# Patient Record
(demographics unavailable — no encounter records)

---

## 2024-11-01 NOTE — DISCUSSION/SUMMARY
[de-identified] : I examined, evaluated, and discussed with the patient. No DVT at ER at Yue visit. He was doing well after the injection by Dr. Lindsey in June or July. Then, he started to have left low back and posterior thigh pain 2 weeks ago. No clear weakness. MRI on Sep 2024 does not show clear pinched nerve.   Based on physical exam and image findings, the patient diagnosis is muscle spasm related low back pain s/p L4-S1 fusion and possible left sacroiliac joint dysfunction.  Treatment plan is medications PRN and PT specific to left sacroiliac joint.   The patient understands everything and all questions were answered. The patient will return 6-8 weeks.

## 2024-11-01 NOTE — PHYSICAL EXAM
[de-identified] : The patient is alert, cooperative, and oriented x 3. Pupils are equal and round. The patient does not have skin rash.  Surgery wound well healed.  Gait is antalgic using a cane. Back ROM is limited with pain. Tenderness at PVM. SLR negative. DTR normal range. Motor and sensory are basically normal throughout bilateral L/E. Circulation of legs is normal. Bladder and bowel functions are normal.  Tenderness at left SI joint area. Compression and distraction test positive.  Tenderness at left posterior knee. No redness, no swelling.  [de-identified] : Lumbar CT and MRI taken at [facility] recently shows s/p TLIF L4-S1. Hardware in place, no breakage or loosening. No clear adjacent segment disease.  Lumbar spine MRI taken at [facility] on Sep 2024 shows no clear spinal nerve compression or adjacent segment disease.

## 2024-11-01 NOTE — HISTORY OF PRESENT ILLNESS
[de-identified] : Follow-up of low back pain and left posterior knee pain. He went to ER and no DVT there. He went to Dr. Lindsey and had another injection which worked well until 2 weeks ago. 2 weeks ago, left low back pain and some left posterior thigh pain started.  Below is the history of initial visit. The patient is 57 years old male who has severe low back pain for 5 days. He also has pain at left posterior thigh. No clear cause of the symptoms. He had a history of 3 posterior lumbar spine surgery by outside spine surgeons. Last one was 1 year ago. He was doing well after surgery and suddenly pain started 5 days ago.   Pain Level:  9 Duration of Symptoms:  5 days Symptom course:  Getting worse Accident:  No   Previous Treatment:    Pain meds:  Yes, NSAIDs, muscle relaxer    Physical therapy:  No    Epidural steroid injection:  No

## 2024-11-01 NOTE — CONSULT LETTER
[Dear  ___] : Dear  [unfilled], [Consult Letter:] : I had the pleasure of evaluating your patient, [unfilled]. [Please see my note below.] : Please see my note below. [Consult Closing:] : Thank you very much for allowing me to participate in the care of this patient.  If you have any questions, please do not hesitate to contact me. [Sincerely,] : Sincerely, [[email or web address]] : Judy Key MD PhD